# Patient Record
Sex: MALE | Race: OTHER | HISPANIC OR LATINO | URBAN - METROPOLITAN AREA
[De-identification: names, ages, dates, MRNs, and addresses within clinical notes are randomized per-mention and may not be internally consistent; named-entity substitution may affect disease eponyms.]

---

## 2022-03-14 ENCOUNTER — EMERGENCY (EMERGENCY)
Facility: HOSPITAL | Age: 22
LOS: 1 days | Discharge: HOME | End: 2022-03-14
Attending: STUDENT IN AN ORGANIZED HEALTH CARE EDUCATION/TRAINING PROGRAM
Payer: SELF-PAY

## 2022-03-14 VITALS
HEART RATE: 56 BPM | OXYGEN SATURATION: 100 % | DIASTOLIC BLOOD PRESSURE: 72 MMHG | RESPIRATION RATE: 16 BRPM | TEMPERATURE: 98 F | SYSTOLIC BLOOD PRESSURE: 139 MMHG

## 2022-03-14 PROCEDURE — 99053 MED SERV 10PM-8AM 24 HR FAC: CPT

## 2022-03-14 PROCEDURE — 99285 EMERGENCY DEPT VISIT HI MDM: CPT

## 2022-03-14 RX ORDER — MORPHINE SULFATE 50 MG/1
4 CAPSULE, EXTENDED RELEASE ORAL ONCE
Refills: 0 | Status: DISCONTINUED | OUTPATIENT
Start: 2022-03-14 | End: 2022-03-14

## 2022-03-14 RX ORDER — KETOROLAC TROMETHAMINE 30 MG/ML
15 SYRINGE (ML) INJECTION ONCE
Refills: 0 | Status: DISCONTINUED | OUTPATIENT
Start: 2022-03-14 | End: 2022-03-14

## 2022-03-14 RX ADMIN — Medication 15 MILLIGRAM(S): at 23:53

## 2022-03-14 RX ADMIN — MORPHINE SULFATE 4 MILLIGRAM(S): 50 CAPSULE, EXTENDED RELEASE ORAL at 23:53

## 2022-03-14 NOTE — ED PROVIDER NOTE - NS ED ROS FT
ROS: CONTUSIONAL: Denies fever, chills, fatigue, wt loss. HEAD: Denies trauma, HA, Dizziness. EYE: Denies Acute visual changes, diplopia. ENMT: Denies change in hearing, tinnitus, epistaxis, difficulty swallowing, sore throat. CARDIO: Denies CP, palpitations, edema. RESP: Denies Cough, SOB , Diff breathing, hemoptysis. GI: ABD pain Denies N/V, , change in bowel movement. URINARY: Denies difficulty urinating, pelvic pain. MS:  Denies joint pain, back pain, weakness, decreased ROM, swelling. NEURO: Denies change in gait, seizures, loss of sensation, dizziness, confusion LOC.  PSY: NO SI/HI. SKIN: Denies Rash, bruising.

## 2022-03-14 NOTE — ED PROVIDER NOTE - PATIENT PORTAL LINK FT
You can access the FollowMyHealth Patient Portal offered by [facility] by registering at the following website: [email or web address]. By joining [employer]’s FollowMyHealth portal, you will also be able to view your health information using other applications (apps) compatible with our system.

## 2022-03-14 NOTE — ED PROVIDER NOTE - ADDITIONAL NOTES AND INSTRUCTIONS:
PT was evaluated At [facility] ED and was found to have a condition that warranted time of to rest and heal from WORK/SCHOOL.   Broderick Thompson PA-C

## 2022-03-14 NOTE — ED PROVIDER NOTE - NSFOLLOWUPINSTRUCTIONS_ED_ALL_ED_FT
Please Uses Over the Counter:   1) Tylenol: 500-650mg every 6hours as needed   2) Ibuprofen/Motrin/Advil: 400-600mg every 6hours as needed  FOR PAIN.     Dolor abdominal dahiana    LO QUE NECESITA SABER:    ¿Qué necesito saber del dolor abdominal dahiana?Generalmente, el dolor abdominal dahiana comienza repentinamente y empeora rápidamente.    Órganos abdominales         ¿Qué causa el dolor abdominal dahiana?  •Toño reacción alérgica a los alimentos, toño intoxicación alimentaria o reflujo ácido      •Estrés      •Estreñimiento o toño obstrucción en los intestinos      •Dolor del periodo menstrual en las mujeres      •Inflamación o ruptura de fernandez apéndice      •Inflamación o infección en el abdomen o un órgano      •Toño úlcera o un desgarre en el esófago, el estómago o los intestinos      •Sangrado en el abdomen o un órgano      •Cálculos en el riñón o la vesícula biliar      •En las mujeres, enfermedades de las trompas de Falopio o de los ovarios, o un embarazo ectópico      ¿Cómo se diagnostica la causa del dolor abdominal dahiana?Fernandez médico lo examinará y le hará preguntas acerca de romana síntomas. Informe al médico cuándo comenzaron romana síntomas y sobre cualquier viaje o cirugía recientes. También dígale qué mejora o empeora el dolor. Con base en lo que encuentre el médico en el examen y romana síntomas, puede que necesite alguno de los siguientes:  •Los análisis de sangrepueden hacerse para revisar si tiene inflamación, fernandez función hepática, romana niveles de células sanguíneas, o para obtener información sobre fernandez geoffrey en general.      •Toño muestra de materia fecalpuede examinarse para christina si usted absorbe los nutrientes de los alimentos que consume. También se puede examinar para detectar gérmenes que pueden estar causando fernandez enfermedad.      •Radiografía, ultrasonido, tomografía computarizada o imagen por resonancia magnética (IRM)podrían usarse para revisar los órganos dentro de fernandez abdomen. A usted le pueden vanessa un líquido de contraste para que los órganos se aprecien mejor en las imágenes. Dígale al médico si usted alguna vez ha tenido toño reacción alérgica al líquido de contraste. No entre a la elena donde se realiza la resonancia magnética con algo de metal. El metal puede causar lesiones serias. Dígale al médico si usted tiene algo de metal dentro de fernandez cuerpo o por encima.      •Toño endoscopiaes un examen para observar el interior del esófago, el estómago y el intestino tomlin. Jeb toño endoscopia, los médicos pueden encontrar problemas en el esófago, el estómago o el intestino tomlin. Algunos problemas pueden arreglarse con herramientas pequeñas. Pueden tomarse muestras de fernandez esófago, estómago o intestino tomlin para enviarlas a un laboratorio para fernandez examinación.  Endoscopia superior           •Toño colonoscopiaes un examen para observar dentro del colon. Jeb toño colonoscopia, los médicos pueden encontrar problemas en el colon. Algunos problemas pueden arreglarse con herramientas pequeñas. Pueden tomarse muestras de fernandez colon para enviarlas a un laboratorio para fernandez examinación.             ¿Cómo se trata el dolor abdominal dahiana?El tratamiento depende de la causa del dolor abdominal. Es posible que usted necesite alguno de los siguientes:  •Los medicamentosestos pueden administrarse para disminuir el dolor, tratar toño infección y manejar romana síntomas, tales vi el estreñimiento.      •La cirugíapuede necesitarse para tratar causas graves del dolor abdominal. Los ejemplos incluyen cirugías para tratar toño apendicitis o toño obstrucción en los intestinos.      ¿Qué puedo hacer para manejar mis síntomas?  •Aplique calorsobre el abdomen de 20 a 30 minutos cada 2 horas por los días que le indiquen. El calor ayuda a disminuir el dolor y los espasmos musculares.      •Realice cambios en los alimentos que consume, de ser necesario.No coma alimentos que causan dolor abdominal u otros síntomas. Ingiera comidas pequeñas, más a menudo. Los siguientes cambios también pueden ayudar:?Coma más alimentos ricos en fibra si tiene estreñimiento.Los alimentos altos en fibra incluyen frutas, verduras, alimentos de grano integral y legumbres.             ?No coma alimentos que causan gas si tiene distensión.Por ejemplo, brócoli, col y coliflor. No tome gaseosas ni bebidas carbonatadas. Estas también pueden provocar gases.      ?No consuma alimentos o bebidas que contienen sorbitol o fructosa si tiene diarrea y distensión.Algunos ejemplos son jugos de frutas, dulces, mermeladas y gomas de mascar sin azúcar.      ?No consuma alimentos altos en grasa.Por ejemplo, comidas fritas, hamburguesas con queso, perros calientes y postres.      ?Limite o no tome cafeína.La cafeína puede empeorar los síntomas, vi la acidez o las náuseas.      ?Danielle suficientes líquidos para evitar la deshidratación causada por la diarrea o los vómitos.Pregunte a fernandez médico sobre la cantidad de líquido que necesita peri todos los alena y cuáles le recomienda.      •Controle fernandez estrés.El estrés puede causar dolor abdominal. Fernandez médico puede recomendarle técnicas de relajación y ejercicios de respiración profunda para ayudar a disminuir el estrés. Fernandez médico puede recomendarle que hable con alguien sobre fernandez estrés o ansiedad, vi un consejero o un amigo de confianza. Duerma lo suficiente y realice ejercicio regularmente.  RAJNI ASIÁTICA CAMINANDO VI EJERCICIO           •Limite o no consuma bebidas alcohólicas.El alcohol puede empeorar el dolor abdominal. Pregúntele a fernandez médico si está víctor que usted consuma alcohol. Pregunte cuanto es la cantidad soria para usted peri. Toño bebida de alcohol equivale a 12 onzas de cerveza, ½ onza de licor o 5 onzas de vino.      •No fume.La nicotina y otros químicos en los cigarrillos pueden dañarle el esófago y el estómago. Pida información a fernandez médico si usted actualmente fuma y necesita ayuda para dejar de fumar. Los cigarrillos electrónicos o el tabaco sin humo igualmente contienen nicotina. Consulte con fernandez médico antes de utilizar estos productos.      ¿Cuándo lee buscar atención inmediata?  •Usted no puede dejar de vomitar o vomita tanvi.      •Usted tiene tanvi en las evacuaciones intestinales o estas tienen aspecto alquitranado.      •Usted tiene sangrado por fernandez recto.      •El tamaño del abdomen es más jamil de lo normal y se siente madeline y más doloroso.      •Tiene dolor abdominal intenso.      •Usted tuan de tener flatulencias y evacuaciones intestinales.      •Usted se siente mareado, débil o tiene sensación de desmayo.      ¿Cuándo lee llamar a mi médico?  •Tiene fiebre.      •Tiene nuevos signos y síntomas, o estos empeoran.      •Romana síntomas no mejoran con el tratamiento.      •Usted tiene preguntas o inquietudes acerca de fernandez condición o cuidado.      ACUERDOS SOBRE FERNANDEZ CUIDADO:    Usted tiene el derecho de ayudar a planear fernandez cuidado. Aprenda todo lo que pueda sobre fernandez condición y vi darle tratamiento. Discuta romana opciones de tratamiento con romana médicos para decidir el cuidado que usted desea recibir. Usted siempre tiene el derecho de rechazar el tratamiento.

## 2022-03-14 NOTE — ED PROVIDER NOTE - CLINICAL SUMMARY MEDICAL DECISION MAKING FREE TEXT BOX
PT with stable VS, no acute distress, non toxic appearing, tolerating PO in the ED, Pt with no acute fidigns, will dc home with supportive care follow up to PCP, bland diet, educated about when to return to the ED if needed. PT verbalizes that he understands all instructions and results. Pt infomred that ED is open and availible 24/7 365 days a yr, encouraged to return to the ED if they have any change in condition, or feel the need for revaluation.    utilized to obtain History, ROS, Physical Exam, explanations of results and plan of care, as well as follow up instructions.

## 2022-03-14 NOTE — ED PROVIDER NOTE - NS ED ATTENDING STATEMENT MOD
This was a shared visit with the CARINA. I reviewed and verified the documentation and independently performed the documented:

## 2022-03-14 NOTE — ED PROVIDER NOTE - OBJECTIVE STATEMENT
PT with no SPMHx presents to the ED with complaint of abd pain x1 day. Pt states that he had a gradual onset of pain in his lower Lt abd that has been constat since onset, has gotten progressively worse feels sharp in nature, non radiating, not made better or worse by anything. Pt admits to decrease appetite, chills, dines HA, SOB, diff breathing, change in BM, urinary symptoms, testicular pain, cough, vomiting. PT with no SPMHx presents to the ED with complaint of abd pain x1 day. Pt states that he had a gradual onset of pain in his lower Lt abd that has been constat since onset, has gotten progressively worse feels sharp in nature, non radiating, not made better or worse by anything. Pt admits to decrease appetite, chills, dines testicular pain,  HA, SOB, diff breathing, change in BM, urinary symptoms, testicular pain, cough, vomiting.

## 2022-03-15 LAB
ALBUMIN SERPL ELPH-MCNC: 4.2 G/DL — SIGNIFICANT CHANGE UP (ref 3.3–5.2)
ALP SERPL-CCNC: 81 U/L — SIGNIFICANT CHANGE UP (ref 40–120)
ALT FLD-CCNC: 27 U/L — SIGNIFICANT CHANGE UP
ANION GAP SERPL CALC-SCNC: 13 MMOL/L — SIGNIFICANT CHANGE UP (ref 5–17)
AST SERPL-CCNC: 37 U/L — SIGNIFICANT CHANGE UP
BASOPHILS # BLD AUTO: 0.03 K/UL — SIGNIFICANT CHANGE UP (ref 0–0.2)
BASOPHILS NFR BLD AUTO: 0.4 % — SIGNIFICANT CHANGE UP (ref 0–2)
BILIRUB SERPL-MCNC: 0.4 MG/DL — SIGNIFICANT CHANGE UP (ref 0.4–2)
BLD GP AB SCN SERPL QL: SIGNIFICANT CHANGE UP
BUN SERPL-MCNC: 18.4 MG/DL — SIGNIFICANT CHANGE UP (ref 8–20)
CALCIUM SERPL-MCNC: 9.1 MG/DL — SIGNIFICANT CHANGE UP (ref 8.6–10.2)
CHLORIDE SERPL-SCNC: 100 MMOL/L — SIGNIFICANT CHANGE UP (ref 98–107)
CO2 SERPL-SCNC: 24 MMOL/L — SIGNIFICANT CHANGE UP (ref 22–29)
CREAT SERPL-MCNC: 0.7 MG/DL — SIGNIFICANT CHANGE UP (ref 0.5–1.3)
EGFR: 135 ML/MIN/1.73M2 — SIGNIFICANT CHANGE UP
EOSINOPHIL # BLD AUTO: 0.14 K/UL — SIGNIFICANT CHANGE UP (ref 0–0.5)
EOSINOPHIL NFR BLD AUTO: 1.9 % — SIGNIFICANT CHANGE UP (ref 0–6)
GLUCOSE SERPL-MCNC: 93 MG/DL — SIGNIFICANT CHANGE UP (ref 70–99)
HCT VFR BLD CALC: 38.7 % — LOW (ref 39–50)
HGB BLD-MCNC: 13 G/DL — SIGNIFICANT CHANGE UP (ref 13–17)
IMM GRANULOCYTES NFR BLD AUTO: 0.1 % — SIGNIFICANT CHANGE UP (ref 0–1.5)
LIDOCAIN IGE QN: 30 U/L — SIGNIFICANT CHANGE UP (ref 22–51)
LYMPHOCYTES # BLD AUTO: 2.27 K/UL — SIGNIFICANT CHANGE UP (ref 1–3.3)
LYMPHOCYTES # BLD AUTO: 30.3 % — SIGNIFICANT CHANGE UP (ref 13–44)
MCHC RBC-ENTMCNC: 28.2 PG — SIGNIFICANT CHANGE UP (ref 27–34)
MCHC RBC-ENTMCNC: 33.6 GM/DL — SIGNIFICANT CHANGE UP (ref 32–36)
MCV RBC AUTO: 83.9 FL — SIGNIFICANT CHANGE UP (ref 80–100)
MONOCYTES # BLD AUTO: 0.6 K/UL — SIGNIFICANT CHANGE UP (ref 0–0.9)
MONOCYTES NFR BLD AUTO: 8 % — SIGNIFICANT CHANGE UP (ref 2–14)
NEUTROPHILS # BLD AUTO: 4.44 K/UL — SIGNIFICANT CHANGE UP (ref 1.8–7.4)
NEUTROPHILS NFR BLD AUTO: 59.3 % — SIGNIFICANT CHANGE UP (ref 43–77)
PLATELET # BLD AUTO: 329 K/UL — SIGNIFICANT CHANGE UP (ref 150–400)
POTASSIUM SERPL-MCNC: 4.4 MMOL/L — SIGNIFICANT CHANGE UP (ref 3.5–5.3)
POTASSIUM SERPL-SCNC: 4.4 MMOL/L — SIGNIFICANT CHANGE UP (ref 3.5–5.3)
PROT SERPL-MCNC: 7.6 G/DL — SIGNIFICANT CHANGE UP (ref 6.6–8.7)
RBC # BLD: 4.61 M/UL — SIGNIFICANT CHANGE UP (ref 4.2–5.8)
RBC # FLD: 13.3 % — SIGNIFICANT CHANGE UP (ref 10.3–14.5)
SARS-COV-2 RNA SPEC QL NAA+PROBE: SIGNIFICANT CHANGE UP
SODIUM SERPL-SCNC: 137 MMOL/L — SIGNIFICANT CHANGE UP (ref 135–145)
WBC # BLD: 7.49 K/UL — SIGNIFICANT CHANGE UP (ref 3.8–10.5)
WBC # FLD AUTO: 7.49 K/UL — SIGNIFICANT CHANGE UP (ref 3.8–10.5)

## 2022-03-15 PROCEDURE — 99284 EMERGENCY DEPT VISIT MOD MDM: CPT | Mod: 25

## 2022-03-15 PROCEDURE — T1013: CPT

## 2022-03-15 PROCEDURE — 83690 ASSAY OF LIPASE: CPT

## 2022-03-15 PROCEDURE — 85025 COMPLETE CBC W/AUTO DIFF WBC: CPT

## 2022-03-15 PROCEDURE — 80053 COMPREHEN METABOLIC PANEL: CPT

## 2022-03-15 PROCEDURE — U0005: CPT

## 2022-03-15 PROCEDURE — 86900 BLOOD TYPING SEROLOGIC ABO: CPT

## 2022-03-15 PROCEDURE — 74177 CT ABD & PELVIS W/CONTRAST: CPT | Mod: MA

## 2022-03-15 PROCEDURE — U0003: CPT

## 2022-03-15 PROCEDURE — 74177 CT ABD & PELVIS W/CONTRAST: CPT | Mod: 26,MA

## 2022-03-15 PROCEDURE — 96374 THER/PROPH/DIAG INJ IV PUSH: CPT | Mod: XU

## 2022-03-15 PROCEDURE — 96375 TX/PRO/DX INJ NEW DRUG ADDON: CPT

## 2022-03-15 PROCEDURE — 86850 RBC ANTIBODY SCREEN: CPT

## 2022-03-15 PROCEDURE — 86901 BLOOD TYPING SEROLOGIC RH(D): CPT

## 2022-03-15 PROCEDURE — 36415 COLL VENOUS BLD VENIPUNCTURE: CPT

## 2022-03-15 NOTE — ED ADULT NURSE NOTE - CHIEF COMPLAINT QUOTE
Ambulatory reporting LLQ abdominal pain that started this morning. Denies N/V/D, fevers, sick contacts. Also reports that his R nare was bleeding earlier tonight.
